# Patient Record
(demographics unavailable — no encounter records)

---

## 2019-08-22 NOTE — NUR
called [facility] and spoke to kangresenaBenedict myrick. All beds are allocated by other patients, no beds are available at this 
time. Will follow up.

## 2019-08-22 NOTE — NUR
called [facility] intake respresenativejose carlos. He 
stated there are currently no available beds at this time but would be best to 
recheck at change of shift at 2300.

## 2019-08-22 NOTE — NUR
Pt walked outside of ER, standing by the wall, walked back to ER , stating 
"I'm suecidal". Pt taken back to Er room #3. Placed on SI precaution. Pt 
stating not having a clear plan,"but I'm very smart and can plan it."

## 2019-08-22 NOTE — NUR
Manjit, friend of patient, line of contact. [phone]. Would like to 
receive update on transfer status when available.

## 2019-08-23 NOTE — NUR
Patient denies active suicidal ideation.  Patient ate 100% of food from his 
breakfast tray.  Patient is not on [address] and is not homeless.  
Patient discharged in stable conditon.  Written and verbal after care 
instructions given to patient. Patient verbalizes understanding of 
instructions.

## 2019-08-23 NOTE — NUR
Patient is eating breakfast tray with good appetite.  Patient wants to go home 
after eating.  MD notified.  Sy Jackson notified.

## 2019-08-30 NOTE — NUR
PT INSISTS THAT HE IS NOT SUICIDAL, PT INSISTS TO GO HOME. PT WALKS IN STEADY 
GAIT. AXOX4, SPEECH CLEAR.